# Patient Record
Sex: MALE | Race: OTHER | Employment: FULL TIME | URBAN - METROPOLITAN AREA
[De-identification: names, ages, dates, MRNs, and addresses within clinical notes are randomized per-mention and may not be internally consistent; named-entity substitution may affect disease eponyms.]

---

## 2018-01-20 ENCOUNTER — HOSPITAL ENCOUNTER (OUTPATIENT)
Dept: GENERAL RADIOLOGY | Age: 33
Discharge: HOME | End: 2018-01-20
Attending: FAMILY MEDICINE
Payer: COMMERCIAL

## 2018-01-20 ENCOUNTER — OFFICE VISIT (OUTPATIENT)
Dept: FAMILY MEDICINE CLINIC | Facility: CLINIC | Age: 33
End: 2018-01-20

## 2018-01-20 VITALS
HEART RATE: 47 BPM | WEIGHT: 152.38 LBS | SYSTOLIC BLOOD PRESSURE: 98 MMHG | DIASTOLIC BLOOD PRESSURE: 55 MMHG | HEIGHT: 67 IN | BODY MASS INDEX: 23.92 KG/M2

## 2018-01-20 DIAGNOSIS — M25.511 ACUTE PAIN OF RIGHT SHOULDER: ICD-10-CM

## 2018-01-20 DIAGNOSIS — M25.561 ACUTE PAIN OF RIGHT KNEE: ICD-10-CM

## 2018-01-20 DIAGNOSIS — M25.511 ACUTE PAIN OF RIGHT SHOULDER: Primary | ICD-10-CM

## 2018-01-20 PROCEDURE — 99212 OFFICE O/P EST SF 10 MIN: CPT | Performed by: FAMILY MEDICINE

## 2018-01-20 PROCEDURE — 73030 X-RAY EXAM OF SHOULDER: CPT | Performed by: FAMILY MEDICINE

## 2018-01-20 PROCEDURE — 99203 OFFICE O/P NEW LOW 30 MIN: CPT | Performed by: FAMILY MEDICINE

## 2018-01-20 NOTE — PROGRESS NOTES
Ernestina Garcia is a 33year old male. Patient presents with:  Shoulder Pain: right shoulder  Knee Pain: right knee     HPI:   2 months - Right shoulder pain - no injury. Bothers him but does not limit him for doing activities.  When lifts straight up, it cr shoulder  voltaren gel up to QID. Start nightly and icing. Referral for PT and x-ray    2. Acute pain of right knee  Nl x-ray 2015. Mild strain. PT      The patient indicates understanding of these issues and agrees to the plan.       Germaine Duff MD

## 2018-01-23 ENCOUNTER — TELEPHONE (OUTPATIENT)
Dept: OTHER | Age: 33
End: 2018-01-23

## 2018-01-24 NOTE — TELEPHONE ENCOUNTER
PA for Diclofenac Sodium 1% gel completed with [facility] via [facility]MM response time 3-5 business days [address]

## 2018-01-30 NOTE — TELEPHONE ENCOUNTER
Fax received from [facility] stating diclofenac was denied. pt must have tried a generic prescription strength oral NSAID in the past 90 days. Trial of an OTC NSAID will not meet criteria.

## 2018-02-03 NOTE — TELEPHONE ENCOUNTER
Spoke with patient (name and  verified), reviewed information, patient verbalized understanding and agrees with plan.   PATIENT PAID OUT OF POCKET FOR DICLOFENAC GEL, $50 WILL CALL BACK IF NEEDS A REFILL OR IF HE DOES NOT FIND IT EFFECTIVE

## 2018-02-08 ENCOUNTER — APPOINTMENT (OUTPATIENT)
Dept: PHYSICAL THERAPY | Facility: HOSPITAL | Age: 33
End: 2018-02-08
Attending: FAMILY MEDICINE
Payer: COMMERCIAL

## 2018-02-12 ENCOUNTER — APPOINTMENT (OUTPATIENT)
Dept: PHYSICAL THERAPY | Facility: HOSPITAL | Age: 33
End: 2018-02-12
Attending: FAMILY MEDICINE
Payer: COMMERCIAL

## 2018-02-14 ENCOUNTER — APPOINTMENT (OUTPATIENT)
Dept: PHYSICAL THERAPY | Facility: HOSPITAL | Age: 33
End: 2018-02-14
Attending: FAMILY MEDICINE
Payer: COMMERCIAL

## 2018-02-19 ENCOUNTER — APPOINTMENT (OUTPATIENT)
Dept: PHYSICAL THERAPY | Facility: HOSPITAL | Age: 33
End: 2018-02-19
Attending: FAMILY MEDICINE
Payer: COMMERCIAL

## 2018-02-20 ENCOUNTER — TELEPHONE (OUTPATIENT)
Dept: OTHER | Age: 33
End: 2018-02-20

## 2018-02-20 RX ORDER — MELOXICAM 7.5 MG/1
7.5 TABLET ORAL 2 TIMES DAILY
Qty: 40 TABLET | Refills: 0 | Status: SHIPPED | OUTPATIENT
Start: 2018-02-20

## 2018-02-20 NOTE — TELEPHONE ENCOUNTER
LMTCB for pt. May transfer to Triage. No HIPAA on file designating can discuss with spouse who called.

## 2018-02-20 NOTE — TELEPHONE ENCOUNTER
Dr Mario Torres: spouse called on behalf of patient who is at work;  States patient paid out of pocket for Diclofenac gel. He tried medication and doesn't help relieve his shoulder pain. Patient is scheduled for PT in April.   (conflict with work schedule and PT

## 2018-02-22 ENCOUNTER — APPOINTMENT (OUTPATIENT)
Dept: PHYSICAL THERAPY | Facility: HOSPITAL | Age: 33
End: 2018-02-22
Attending: FAMILY MEDICINE
Payer: COMMERCIAL

## 2018-02-23 NOTE — TELEPHONE ENCOUNTER
[facility] and was informed pt picked up the meloxicam yesterday (was covered by insurance). Closing encounter since pt is not returning out calls.

## 2018-02-26 ENCOUNTER — APPOINTMENT (OUTPATIENT)
Dept: PHYSICAL THERAPY | Facility: HOSPITAL | Age: 33
End: 2018-02-26
Attending: FAMILY MEDICINE
Payer: COMMERCIAL

## 2018-03-01 ENCOUNTER — APPOINTMENT (OUTPATIENT)
Dept: PHYSICAL THERAPY | Facility: HOSPITAL | Age: 33
End: 2018-03-01
Attending: FAMILY MEDICINE
Payer: COMMERCIAL

## 2018-03-08 ENCOUNTER — APPOINTMENT (OUTPATIENT)
Dept: PHYSICAL THERAPY | Facility: HOSPITAL | Age: 33
End: 2018-03-08
Attending: FAMILY MEDICINE
Payer: COMMERCIAL

## 2018-04-02 ENCOUNTER — TELEPHONE (OUTPATIENT)
Dept: FAMILY MEDICINE CLINIC | Facility: CLINIC | Age: 33
End: 2018-04-02

## 2018-04-02 ENCOUNTER — OFFICE VISIT (OUTPATIENT)
Dept: PHYSICAL THERAPY | Age: 33
End: 2018-04-02
Attending: FAMILY MEDICINE
Payer: COMMERCIAL

## 2018-04-02 DIAGNOSIS — M25.511 ACUTE PAIN OF RIGHT SHOULDER: ICD-10-CM

## 2018-04-02 DIAGNOSIS — M25.561 ACUTE PAIN OF RIGHT KNEE: Primary | ICD-10-CM

## 2018-04-02 DIAGNOSIS — M25.561 ACUTE PAIN OF RIGHT KNEE: ICD-10-CM

## 2018-04-02 PROCEDURE — 97530 THERAPEUTIC ACTIVITIES: CPT | Performed by: PHYSICAL THERAPIST

## 2018-04-02 PROCEDURE — 97161 PT EVAL LOW COMPLEX 20 MIN: CPT | Performed by: PHYSICAL THERAPIST

## 2018-04-02 NOTE — PROGRESS NOTES
P.T. EVALUATION:   Referring Physician: Dr. Justin Kuhn  Diagnosis: Acute pain of right knee (M25.561)  Acute pain of right shoulder (M25.511)    Date of Onset: 1/20/2018 Date of Service: 4/2/2018     PATIENT SUMMARY   Yumiko Coronado is a 33year old y/o ma patient. Charges: PT Eval Low Complexity, Thera Act1      Total Timed Treatment: 15 min     Total Treatment Time: 45 min         PLAN OF CARE:    Goals: to be met in 6 weeks  1.  Patient will be independent with HEP, its progression, and management of re

## 2018-04-02 NOTE — TELEPHONE ENCOUNTER
Lashonda De Jesus from Physical therapy is calling asking to extend his physical therapy orders, he has an appt at [address] [facility] today. Patient of Dr. Tacho James. Please advise. Physical therapy order pended for you.

## 2018-04-10 ENCOUNTER — OFFICE VISIT (OUTPATIENT)
Dept: PHYSICAL THERAPY | Age: 33
End: 2018-04-10
Attending: FAMILY MEDICINE
Payer: COMMERCIAL

## 2018-04-10 PROCEDURE — 97110 THERAPEUTIC EXERCISES: CPT

## 2018-04-10 NOTE — PROGRESS NOTES
Diagnosis: Diagnosis: Acute pain of right knee (M25.561)  Acute pain of right shoulder (M25.511)          Next MD visit: none scheduled  Fall Risk: standard         Precautions: n/a          Medication Changes since last visit?: No    Subjective: Patient r Treatment Time: 55 min

## 2018-04-12 ENCOUNTER — OFFICE VISIT (OUTPATIENT)
Dept: PHYSICAL THERAPY | Age: 33
End: 2018-04-12
Attending: FAMILY MEDICINE
Payer: COMMERCIAL

## 2018-04-12 PROCEDURE — 97110 THERAPEUTIC EXERCISES: CPT

## 2018-04-12 NOTE — PROGRESS NOTES
Diagnosis: Diagnosis: Acute pain of right knee (M25.561)  Acute pain of right shoulder (M25.511)          Next MD visit: none scheduled  Fall Risk: standard         Precautions: n/a          Medication Changes since last visit?: No    Subjective: Patient r Assessment: Initiated tricep strengthening and progressed rotator cuff and scapular stabilization exercises without increased pain noted to address deficits. Plan: Reassess patient symptoms. Attempt pec minor stretch.        Charges: EX 3

## 2018-04-17 ENCOUNTER — OFFICE VISIT (OUTPATIENT)
Dept: PHYSICAL THERAPY | Age: 33
End: 2018-04-17
Attending: FAMILY MEDICINE
Payer: COMMERCIAL

## 2018-04-17 PROCEDURE — 97110 THERAPEUTIC EXERCISES: CPT

## 2018-04-17 NOTE — PROGRESS NOTES
Diagnosis: Diagnosis: Acute pain of right knee (M25.561)  Acute pain of right shoulder (M25.511)          Next MD visit: none scheduled  Fall Risk: standard         Precautions: n/a          Medication Changes since last visit?: No    Subjective: Patient r under elbow  - standing lower trap facing wall lift offs 5\" holds 2 x 10  - wall push ups with SB 2 x 10    - standing B mid trap horizontal abduction with OH shoulder flexion 2 x 10 with RTB    Manual PT      Thera Activity      Modalities            Ass

## 2018-04-19 ENCOUNTER — OFFICE VISIT (OUTPATIENT)
Dept: PHYSICAL THERAPY | Age: 33
End: 2018-04-19
Attending: FAMILY MEDICINE
Payer: COMMERCIAL

## 2018-04-19 PROCEDURE — 97110 THERAPEUTIC EXERCISES: CPT

## 2018-04-19 NOTE — PROGRESS NOTES
Diagnosis: Diagnosis: Acute pain of right knee (M25.561)  Acute pain of right shoulder (M25.511)          Next MD visit: none scheduled  Fall Risk: standard         Precautions: n/a          Medication Changes since last visit?: No    Subjective: Patient r SB wall push ups 2 x 10   - OH SB toss at wall 2 x 1 minute    - doorway pec minor stretch 5\" holds x 10      Manual PT      Thera Activity      Modalities            Assessment: Advanced reps and exercises to address deficits without increased pain noted

## 2018-04-24 ENCOUNTER — OFFICE VISIT (OUTPATIENT)
Dept: PHYSICAL THERAPY | Age: 33
End: 2018-04-24
Attending: FAMILY MEDICINE
Payer: COMMERCIAL

## 2018-04-24 PROCEDURE — 97110 THERAPEUTIC EXERCISES: CPT

## 2018-04-24 NOTE — PROGRESS NOTES
Diagnosis: Diagnosis: Acute pain of right knee (M25.561)  Acute pain of right shoulder (M25.511)          Next MD visit: none scheduled  Fall Risk: standard         Precautions: n/a          Medication Changes since last visit?: No    Subjective: Patient r exercises w/o pain. Plan: Reassess patient symptoms.        Charges: EX 3       Total Timed Treatment: 50 min  Total Treatment Time: 50 min

## 2018-04-26 ENCOUNTER — OFFICE VISIT (OUTPATIENT)
Dept: PHYSICAL THERAPY | Age: 33
End: 2018-04-26
Attending: FAMILY MEDICINE
Payer: COMMERCIAL

## 2018-04-26 PROCEDURE — 97110 THERAPEUTIC EXERCISES: CPT

## 2018-04-26 NOTE — PROGRESS NOTES
Diagnosis: Diagnosis: Acute pain of right knee (M25.561)  Acute pain of right shoulder (M25.511)          Next MD visit: none scheduled  Fall Risk: standard         Precautions: n/a          Medication Changes since last visit?: No    Subjective: Patient r Activity      Modalities            Assessment: Increased focus on end range ER strengthening above 90 degrees abduction to address strength deficits. Issued yellow t-band for HEP. Collaborated with PT patient response to today's treatment.        Plan: Carito Motta

## 2018-04-30 ENCOUNTER — OFFICE VISIT (OUTPATIENT)
Dept: PHYSICAL THERAPY | Age: 33
End: 2018-04-30
Attending: FAMILY MEDICINE
Payer: COMMERCIAL

## 2018-04-30 PROCEDURE — 97530 THERAPEUTIC ACTIVITIES: CPT | Performed by: PHYSICAL THERAPIST

## 2018-04-30 PROCEDURE — 97110 THERAPEUTIC EXERCISES: CPT | Performed by: PHYSICAL THERAPIST

## 2018-04-30 NOTE — PROGRESS NOTES
Diagnosis: Diagnosis: Acute pain of right knee (M25.561)  Acute pain of right shoulder (M25.511)          Next MD visit: none scheduled  Fall Risk: standard         Precautions: n/a          Medication Changes since last visit?: No    Subjective: Patient s

## 2018-05-07 ENCOUNTER — TELEPHONE (OUTPATIENT)
Dept: PHYSICAL THERAPY | Age: 33
End: 2018-05-07

## 2018-05-07 ENCOUNTER — APPOINTMENT (OUTPATIENT)
Dept: PHYSICAL THERAPY | Age: 33
End: 2018-05-07
Attending: INTERNAL MEDICINE
Payer: COMMERCIAL

## 2019-08-16 ENCOUNTER — TELEPHONE (OUTPATIENT)
Dept: OPHTHALMOLOGY | Facility: CLINIC | Age: 34
End: 2019-08-16

## 2019-08-16 ENCOUNTER — NURSE TRIAGE (OUTPATIENT)
Dept: OTHER | Age: 34
End: 2019-08-16

## 2019-08-16 ENCOUNTER — HOSPITAL ENCOUNTER (OUTPATIENT)
Age: 34
Discharge: HOME | End: 2019-08-16
Attending: FAMILY MEDICINE
Payer: COMMERCIAL

## 2019-08-16 VITALS
RESPIRATION RATE: 18 BRPM | TEMPERATURE: 98 F | DIASTOLIC BLOOD PRESSURE: 74 MMHG | HEART RATE: 53 BPM | WEIGHT: 150 LBS | HEIGHT: 67 IN | BODY MASS INDEX: 23.54 KG/M2 | OXYGEN SATURATION: 97 % | SYSTOLIC BLOOD PRESSURE: 127 MMHG

## 2019-08-16 DIAGNOSIS — H01.024 SQUAMOUS BLEPHARITIS OF LEFT UPPER EYELID: Primary | ICD-10-CM

## 2019-08-16 PROCEDURE — 99203 OFFICE O/P NEW LOW 30 MIN: CPT

## 2019-08-16 PROCEDURE — 99213 OFFICE O/P EST LOW 20 MIN: CPT

## 2019-08-16 RX ORDER — ERYTHROMYCIN 5 MG/G
1 OINTMENT OPHTHALMIC 3 TIMES DAILY
Qty: 1 TUBE | Refills: 0 | Status: SHIPPED | OUTPATIENT
Start: 2019-08-16 | End: 2019-08-23

## 2019-08-16 NOTE — TELEPHONE ENCOUNTER
Please reply to pool: EM RN TRIAGE    Action Requested: Summary for Provider     []  Critical Lab, Recommendations Needed  [x] Need Additional Advice  []   FYI    []   Need Orders  [] Need Medications Sent to Pharmacy  []  Other     SUMMARY: spoke with [facility]

## 2019-08-16 NOTE — TELEPHONE ENCOUNTER
Spoke to patient after nurse triage sent call through. Pt complaining of swollen LEONEL with a small bump that started yesterday. Instructed pt  to use warm compresses for a few days. Told him to call back if symptoms do not improve or get worse.  Pt states he

## 2019-08-17 NOTE — ED NOTES
Wash hands before and after oint. warm cloth to eye 2 times daily and as needed.  Follow up with pp in office

## 2019-08-17 NOTE — ED PROVIDER NOTES
Patient Seen in: [facility] In [address] [place] [address]    History   Patient presents with:  Eyelid Swelling    Stated Complaint: Left Eye Problem    HPI    Left eyelid swelling and redness.    Since yesterday   No eye drainage   No vision changes has a normal mood and affect. His behavior is normal.   Nursing note and vitals reviewed.         ED Course     MDM     Disposition and Plan     Clinical Impression:  Squamous blepharitis of left upper eyelid  (primary encounter diagnosis)     Warm compress

## 2020-05-20 ENCOUNTER — TELEPHONE (OUTPATIENT)
Dept: FAMILY MEDICINE CLINIC | Facility: CLINIC | Age: 35
End: 2020-05-20

## 2020-05-20 DIAGNOSIS — U07.1 COVID-19: Primary | ICD-10-CM

## 2020-06-29 ENCOUNTER — NURSE TRIAGE (OUTPATIENT)
Dept: FAMILY MEDICINE CLINIC | Facility: CLINIC | Age: 35
End: 2020-06-29

## 2020-06-29 NOTE — TELEPHONE ENCOUNTER
Action Requested: Summary for Provider     []  Critical Lab, Recommendations Needed  [x] Need Additional Advice  []   FYI    []   Need Orders  [] Need Medications Sent to Pharmacy  []  Other     SUMMARY: wife calling to report an eye problem for her sangban

## 2020-06-30 NOTE — TELEPHONE ENCOUNTER
Patient's wife called, instructed of Dr. Aura Rivera recommendations. She verbalized understanding. Dr. Nadeem Andujar office phone number, [phone], and Dr. Ronit Sorensen office phone number, [phone], given to spouse.

## 2020-06-30 NOTE — TELEPHONE ENCOUNTER
I have not seen this patient in 2 years. Cannot give medications without an office visit but given length of issues with this I would recommend he see eye doctor. Can give number for Dr. Sarah Bradshaw and Dr. Mary Kate Tovar.

## 2020-07-01 ENCOUNTER — TELEPHONE (OUTPATIENT)
Dept: OPHTHALMOLOGY | Facility: CLINIC | Age: 35
End: 2020-07-01

## 2020-07-01 NOTE — TELEPHONE ENCOUNTER
Per pt's spouse, pt is being referred by PCP for a stye in upper lid of right eye. Pt's eye is getting swollen and red.  Please advise

## 2020-07-02 NOTE — TELEPHONE ENCOUNTER
Patient has a stye on his right upper lid x 6 days. Wife says that this has happened to him before and it happens about 2-3 times per year. Recommend aggressive warm compresses; told patient's wife he should use them 40-50  times per day. Told her that he should use warm compresses twice a day forever to prevent further chalazia. I offered him an appointment next week with [facility]IE, but the wife declined, starting that he will try the warm compresses and let us know if there is no improvement.

## 2022-02-18 ENCOUNTER — OFFICE VISIT (OUTPATIENT)
Dept: FAMILY MEDICINE CLINIC | Facility: CLINIC | Age: 37
End: 2022-02-18
Payer: COMMERCIAL

## 2022-02-18 VITALS
DIASTOLIC BLOOD PRESSURE: 62 MMHG | HEIGHT: 67 IN | SYSTOLIC BLOOD PRESSURE: 112 MMHG | BODY MASS INDEX: 26.84 KG/M2 | WEIGHT: 171 LBS | HEART RATE: 70 BPM

## 2022-02-18 DIAGNOSIS — Z00.00 PHYSICAL EXAM: Primary | ICD-10-CM

## 2022-02-18 PROCEDURE — 3078F DIAST BP <80 MM HG: CPT | Performed by: FAMILY MEDICINE

## 2022-02-18 PROCEDURE — 3074F SYST BP LT 130 MM HG: CPT | Performed by: FAMILY MEDICINE

## 2022-02-18 PROCEDURE — 90715 TDAP VACCINE 7 YRS/> IM: CPT | Performed by: FAMILY MEDICINE

## 2022-02-18 PROCEDURE — 90471 IMMUNIZATION ADMIN: CPT | Performed by: FAMILY MEDICINE

## 2022-02-18 PROCEDURE — 99385 PREV VISIT NEW AGE 18-39: CPT | Performed by: FAMILY MEDICINE

## 2022-02-18 PROCEDURE — 3008F BODY MASS INDEX DOCD: CPT | Performed by: FAMILY MEDICINE

## 2022-02-19 ENCOUNTER — LAB ENCOUNTER (OUTPATIENT)
Dept: LAB | Age: 37
End: 2022-02-19
Attending: FAMILY MEDICINE
Payer: COMMERCIAL

## 2022-02-19 DIAGNOSIS — Z00.00 PHYSICAL EXAM: ICD-10-CM

## 2022-02-19 LAB
ALBUMIN SERPL-MCNC: 4.1 G/DL (ref 3.4–5)
ALBUMIN/GLOB SERPL: 1.3 {RATIO} (ref 1–2)
ALP LIVER SERPL-CCNC: 67 U/L
ALT SERPL-CCNC: 26 U/L
ANION GAP SERPL CALC-SCNC: 5 MMOL/L (ref 0–18)
AST SERPL-CCNC: 23 U/L (ref 15–37)
BASOPHILS # BLD AUTO: 0.03 X10(3) UL (ref 0–0.2)
BASOPHILS NFR BLD AUTO: 0.5 %
BILIRUB SERPL-MCNC: 0.3 MG/DL (ref 0.1–2)
BILIRUB UR QL: NEGATIVE
BUN BLD-MCNC: 18 MG/DL (ref 7–18)
BUN/CREAT SERPL: 18.9 (ref 10–20)
CALCIUM BLD-MCNC: 9.5 MG/DL (ref 8.5–10.1)
CHLORIDE SERPL-SCNC: 108 MMOL/L (ref 98–112)
CHOLEST SERPL-MCNC: 174 MG/DL (ref ?–200)
CLARITY UR: CLEAR
CO2 SERPL-SCNC: 28 MMOL/L (ref 21–32)
COLOR UR: YELLOW
CREAT BLD-MCNC: 0.95 MG/DL
DEPRECATED RDW RBC AUTO: 42 FL (ref 35.1–46.3)
EOSINOPHIL # BLD AUTO: 0.12 X10(3) UL (ref 0–0.7)
EOSINOPHIL NFR BLD AUTO: 2 %
ERYTHROCYTE [DISTWIDTH] IN BLOOD BY AUTOMATED COUNT: 12.4 % (ref 11–15)
EST. AVERAGE GLUCOSE BLD GHB EST-MCNC: 123 MG/DL (ref 68–126)
FASTING PATIENT LIPID ANSWER: YES
FASTING STATUS PATIENT QL REPORTED: YES
GLOBULIN PLAS-MCNC: 3.1 G/DL (ref 2.8–4.4)
GLUCOSE BLD-MCNC: 105 MG/DL (ref 70–99)
GLUCOSE UR-MCNC: NEGATIVE MG/DL
HBA1C MFR BLD: 5.9 % (ref ?–5.7)
HCT VFR BLD AUTO: 42.8 %
HDLC SERPL-MCNC: 36 MG/DL (ref 40–59)
HGB BLD-MCNC: 14.1 G/DL
HGB UR QL STRIP.AUTO: NEGATIVE
IMM GRANULOCYTES # BLD AUTO: 0 X10(3) UL (ref 0–1)
IMM GRANULOCYTES NFR BLD: 0 %
KETONES UR-MCNC: NEGATIVE MG/DL
LDLC SERPL CALC-MCNC: 123 MG/DL (ref ?–100)
LEUKOCYTE ESTERASE UR QL STRIP.AUTO: NEGATIVE
LYMPHOCYTES # BLD AUTO: 2.11 X10(3) UL (ref 1–4)
LYMPHOCYTES NFR BLD AUTO: 35.6 %
MCH RBC QN AUTO: 30.3 PG (ref 26–34)
MCHC RBC AUTO-ENTMCNC: 32.9 G/DL (ref 31–37)
MCV RBC AUTO: 92 FL
MONOCYTES # BLD AUTO: 0.65 X10(3) UL (ref 0.1–1)
MONOCYTES NFR BLD AUTO: 11 %
NEUTROPHILS # BLD AUTO: 3.01 X10 (3) UL (ref 1.5–7.7)
NEUTROPHILS # BLD AUTO: 3.01 X10(3) UL (ref 1.5–7.7)
NEUTROPHILS NFR BLD AUTO: 50.9 %
NITRITE UR QL STRIP.AUTO: NEGATIVE
NONHDLC SERPL-MCNC: 138 MG/DL (ref ?–130)
OSMOLALITY SERPL CALC.SUM OF ELEC: 294 MOSM/KG (ref 275–295)
PH UR: 6 [PH] (ref 5–8)
PLATELET # BLD AUTO: 292 10(3)UL (ref 150–450)
POTASSIUM SERPL-SCNC: 4.5 MMOL/L (ref 3.5–5.1)
PROT SERPL-MCNC: 7.2 G/DL (ref 6.4–8.2)
PROT UR-MCNC: 30 MG/DL
RBC # BLD AUTO: 4.65 X10(6)UL
SODIUM SERPL-SCNC: 141 MMOL/L (ref 136–145)
SP GR UR STRIP: 1.03 (ref 1–1.03)
T4 FREE SERPL-MCNC: 1 NG/DL (ref 0.8–1.7)
TRIGL SERPL-MCNC: 81 MG/DL (ref 30–149)
TSI SER-ACNC: 5.18 MIU/ML (ref 0.36–3.74)
UROBILINOGEN UR STRIP-ACNC: <2
VIT D+METAB SERPL-MCNC: 23 NG/ML (ref 30–100)
VLDLC SERPL CALC-MCNC: 14 MG/DL (ref 0–30)
WBC # BLD AUTO: 5.9 X10(3) UL (ref 4–11)

## 2022-02-19 PROCEDURE — 93010 ELECTROCARDIOGRAM REPORT: CPT | Performed by: FAMILY MEDICINE

## 2022-02-19 PROCEDURE — 93005 ELECTROCARDIOGRAM TRACING: CPT

## 2022-02-19 PROCEDURE — 80061 LIPID PANEL: CPT

## 2022-02-19 PROCEDURE — 85025 COMPLETE CBC W/AUTO DIFF WBC: CPT

## 2022-02-19 PROCEDURE — 80053 COMPREHEN METABOLIC PANEL: CPT

## 2022-02-19 PROCEDURE — 84443 ASSAY THYROID STIM HORMONE: CPT

## 2022-02-19 PROCEDURE — 83036 HEMOGLOBIN GLYCOSYLATED A1C: CPT

## 2022-02-19 PROCEDURE — 81001 URINALYSIS AUTO W/SCOPE: CPT

## 2022-02-19 PROCEDURE — 36415 COLL VENOUS BLD VENIPUNCTURE: CPT

## 2022-02-19 PROCEDURE — 82306 VITAMIN D 25 HYDROXY: CPT

## 2022-02-19 PROCEDURE — 84439 ASSAY OF FREE THYROXINE: CPT

## 2022-02-21 RX ORDER — ERGOCALCIFEROL 1.25 MG/1
50000 CAPSULE ORAL WEEKLY
Qty: 12 CAPSULE | Refills: 4 | Status: SHIPPED | OUTPATIENT
Start: 2022-02-21 | End: 2022-03-23

## 2023-03-25 ENCOUNTER — OFFICE VISIT (OUTPATIENT)
Dept: FAMILY MEDICINE CLINIC | Facility: CLINIC | Age: 38
End: 2023-03-25

## 2023-03-25 VITALS
WEIGHT: 168 LBS | HEIGHT: 67 IN | DIASTOLIC BLOOD PRESSURE: 67 MMHG | HEART RATE: 54 BPM | BODY MASS INDEX: 26.37 KG/M2 | SYSTOLIC BLOOD PRESSURE: 129 MMHG

## 2023-03-25 DIAGNOSIS — R10.32 LEFT LOWER QUADRANT ABDOMINAL PAIN: Primary | ICD-10-CM

## 2023-03-25 PROCEDURE — 3008F BODY MASS INDEX DOCD: CPT | Performed by: FAMILY MEDICINE

## 2023-03-25 PROCEDURE — 99213 OFFICE O/P EST LOW 20 MIN: CPT | Performed by: FAMILY MEDICINE

## 2023-03-25 PROCEDURE — 3078F DIAST BP <80 MM HG: CPT | Performed by: FAMILY MEDICINE

## 2023-03-25 PROCEDURE — 3074F SYST BP LT 130 MM HG: CPT | Performed by: FAMILY MEDICINE

## 2023-04-22 ENCOUNTER — OFFICE VISIT (OUTPATIENT)
Dept: FAMILY MEDICINE CLINIC | Facility: CLINIC | Age: 38
End: 2023-04-22

## 2023-04-22 ENCOUNTER — LAB ENCOUNTER (OUTPATIENT)
Dept: LAB | Age: 38
End: 2023-04-22
Attending: FAMILY MEDICINE
Payer: COMMERCIAL

## 2023-04-22 ENCOUNTER — EKG ENCOUNTER (OUTPATIENT)
Dept: LAB | Age: 38
End: 2023-04-22
Attending: FAMILY MEDICINE
Payer: COMMERCIAL

## 2023-04-22 VITALS
HEART RATE: 47 BPM | DIASTOLIC BLOOD PRESSURE: 68 MMHG | HEIGHT: 67 IN | WEIGHT: 164.81 LBS | BODY MASS INDEX: 25.87 KG/M2 | SYSTOLIC BLOOD PRESSURE: 124 MMHG

## 2023-04-22 DIAGNOSIS — R07.9 CHEST PAIN, UNSPECIFIED TYPE: ICD-10-CM

## 2023-04-22 DIAGNOSIS — Z00.00 PHYSICAL EXAM: ICD-10-CM

## 2023-04-22 DIAGNOSIS — R10.32 LEFT INGUINAL PAIN: ICD-10-CM

## 2023-04-22 DIAGNOSIS — Z00.00 PHYSICAL EXAM: Primary | ICD-10-CM

## 2023-04-22 LAB
ALBUMIN SERPL-MCNC: 4 G/DL (ref 3.4–5)
ALBUMIN/GLOB SERPL: 1.1 {RATIO} (ref 1–2)
ALP LIVER SERPL-CCNC: 60 U/L
ALT SERPL-CCNC: 30 U/L
ANION GAP SERPL CALC-SCNC: 6 MMOL/L (ref 0–18)
AST SERPL-CCNC: 31 U/L (ref 15–37)
BASOPHILS # BLD AUTO: 0.03 X10(3) UL (ref 0–0.2)
BASOPHILS NFR BLD AUTO: 0.7 %
BILIRUB SERPL-MCNC: 0.6 MG/DL (ref 0.1–2)
BILIRUB UR QL: NEGATIVE
BUN BLD-MCNC: 19 MG/DL (ref 7–18)
BUN/CREAT SERPL: 18.6 (ref 10–20)
CALCIUM BLD-MCNC: 9.3 MG/DL (ref 8.5–10.1)
CHLORIDE SERPL-SCNC: 108 MMOL/L (ref 98–112)
CHOLEST SERPL-MCNC: 162 MG/DL (ref ?–200)
CLARITY UR: CLEAR
CO2 SERPL-SCNC: 28 MMOL/L (ref 21–32)
CREAT BLD-MCNC: 1.02 MG/DL
DEPRECATED RDW RBC AUTO: 42.5 FL (ref 35.1–46.3)
EOSINOPHIL # BLD AUTO: 0.12 X10(3) UL (ref 0–0.7)
EOSINOPHIL NFR BLD AUTO: 2.7 %
ERYTHROCYTE [DISTWIDTH] IN BLOOD BY AUTOMATED COUNT: 12.9 % (ref 11–15)
EST. AVERAGE GLUCOSE BLD GHB EST-MCNC: 120 MG/DL (ref 68–126)
FASTING PATIENT LIPID ANSWER: YES
FASTING STATUS PATIENT QL REPORTED: YES
GFR SERPLBLD BASED ON 1.73 SQ M-ARVRAT: 98 ML/MIN/1.73M2 (ref 60–?)
GLOBULIN PLAS-MCNC: 3.8 G/DL (ref 2.8–4.4)
GLUCOSE BLD-MCNC: 95 MG/DL (ref 70–99)
GLUCOSE UR-MCNC: NORMAL MG/DL
HBA1C MFR BLD: 5.8 % (ref ?–5.7)
HCT VFR BLD AUTO: 40.8 %
HDLC SERPL-MCNC: 43 MG/DL (ref 40–59)
HGB BLD-MCNC: 13.4 G/DL
HGB UR QL STRIP.AUTO: NEGATIVE
IMM GRANULOCYTES # BLD AUTO: 0.01 X10(3) UL (ref 0–1)
IMM GRANULOCYTES NFR BLD: 0.2 %
KETONES UR-MCNC: NEGATIVE MG/DL
LDLC SERPL CALC-MCNC: 110 MG/DL (ref ?–100)
LEUKOCYTE ESTERASE UR QL STRIP.AUTO: NEGATIVE
LYMPHOCYTES # BLD AUTO: 1.83 X10(3) UL (ref 1–4)
LYMPHOCYTES NFR BLD AUTO: 40.7 %
MCH RBC QN AUTO: 29.7 PG (ref 26–34)
MCHC RBC AUTO-ENTMCNC: 32.8 G/DL (ref 31–37)
MCV RBC AUTO: 90.5 FL
MONOCYTES # BLD AUTO: 0.45 X10(3) UL (ref 0.1–1)
MONOCYTES NFR BLD AUTO: 10 %
NEUTROPHILS # BLD AUTO: 2.06 X10 (3) UL (ref 1.5–7.7)
NEUTROPHILS # BLD AUTO: 2.06 X10(3) UL (ref 1.5–7.7)
NEUTROPHILS NFR BLD AUTO: 45.7 %
NITRITE UR QL STRIP.AUTO: NEGATIVE
NONHDLC SERPL-MCNC: 119 MG/DL (ref ?–130)
OSMOLALITY SERPL CALC.SUM OF ELEC: 296 MOSM/KG (ref 275–295)
PH UR: 7 [PH] (ref 5–8)
PLATELET # BLD AUTO: 323 10(3)UL (ref 150–450)
POTASSIUM SERPL-SCNC: 4.6 MMOL/L (ref 3.5–5.1)
PROT SERPL-MCNC: 7.8 G/DL (ref 6.4–8.2)
RBC # BLD AUTO: 4.51 X10(6)UL
SODIUM SERPL-SCNC: 142 MMOL/L (ref 136–145)
SP GR UR STRIP: 1.02 (ref 1–1.03)
T4 FREE SERPL-MCNC: 0.9 NG/DL (ref 0.8–1.7)
TRIGL SERPL-MCNC: 44 MG/DL (ref 30–149)
TSI SER-ACNC: 4.33 MIU/ML (ref 0.36–3.74)
UROBILINOGEN UR STRIP-ACNC: NORMAL
VIT D+METAB SERPL-MCNC: 32.8 NG/ML (ref 30–100)
VLDLC SERPL CALC-MCNC: 7 MG/DL (ref 0–30)
WBC # BLD AUTO: 4.5 X10(3) UL (ref 4–11)

## 2023-04-22 PROCEDURE — 81001 URINALYSIS AUTO W/SCOPE: CPT

## 2023-04-22 PROCEDURE — 36415 COLL VENOUS BLD VENIPUNCTURE: CPT

## 2023-04-22 PROCEDURE — 80061 LIPID PANEL: CPT

## 2023-04-22 PROCEDURE — 85025 COMPLETE CBC W/AUTO DIFF WBC: CPT

## 2023-04-22 PROCEDURE — 84443 ASSAY THYROID STIM HORMONE: CPT

## 2023-04-22 PROCEDURE — 80053 COMPREHEN METABOLIC PANEL: CPT

## 2023-04-22 PROCEDURE — 83036 HEMOGLOBIN GLYCOSYLATED A1C: CPT

## 2023-04-22 PROCEDURE — 93005 ELECTROCARDIOGRAM TRACING: CPT

## 2023-04-22 PROCEDURE — 84439 ASSAY OF FREE THYROXINE: CPT

## 2023-04-22 PROCEDURE — 93010 ELECTROCARDIOGRAM REPORT: CPT | Performed by: INTERNAL MEDICINE

## 2023-04-22 PROCEDURE — 82306 VITAMIN D 25 HYDROXY: CPT

## 2023-04-22 RX ORDER — NAPROXEN 250 MG/1
250 TABLET ORAL 2 TIMES DAILY WITH MEALS
Qty: 60 TABLET | Refills: 0 | Status: SHIPPED | OUTPATIENT
Start: 2023-04-22

## 2023-04-24 LAB
ATRIAL RATE: 41 BPM
P AXIS: 56 DEGREES
P-R INTERVAL: 166 MS
Q-T INTERVAL: 464 MS
QRS DURATION: 86 MS
QTC CALCULATION (BEZET): 382 MS
R AXIS: 26 DEGREES
T AXIS: 21 DEGREES
VENTRICULAR RATE: 41 BPM

## 2024-05-04 ENCOUNTER — LAB ENCOUNTER (OUTPATIENT)
Dept: LAB | Age: 39
End: 2024-05-04
Attending: FAMILY MEDICINE
Payer: COMMERCIAL

## 2024-05-04 ENCOUNTER — OFFICE VISIT (OUTPATIENT)
Dept: FAMILY MEDICINE CLINIC | Facility: CLINIC | Age: 39
End: 2024-05-04
Payer: COMMERCIAL

## 2024-05-04 VITALS
WEIGHT: 169.38 LBS | HEART RATE: 51 BPM | HEIGHT: 67 IN | BODY MASS INDEX: 26.58 KG/M2 | SYSTOLIC BLOOD PRESSURE: 118 MMHG | DIASTOLIC BLOOD PRESSURE: 74 MMHG

## 2024-05-04 DIAGNOSIS — Z00.00 PHYSICAL EXAM: Primary | ICD-10-CM

## 2024-05-04 DIAGNOSIS — Z00.00 PHYSICAL EXAM: ICD-10-CM

## 2024-05-04 LAB
ALBUMIN SERPL-MCNC: 4.4 G/DL (ref 3.2–4.8)
ALBUMIN/GLOB SERPL: 1.5 {RATIO} (ref 1–2)
ALP LIVER SERPL-CCNC: 59 U/L
ALT SERPL-CCNC: 25 U/L
ANION GAP SERPL CALC-SCNC: 5 MMOL/L (ref 0–18)
AST SERPL-CCNC: 37 U/L (ref ?–34)
BASOPHILS # BLD AUTO: 0.04 X10(3) UL (ref 0–0.2)
BASOPHILS NFR BLD AUTO: 0.7 %
BILIRUB SERPL-MCNC: 0.5 MG/DL (ref 0.3–1.2)
BILIRUB UR QL: NEGATIVE
BUN BLD-MCNC: 17 MG/DL (ref 9–23)
BUN/CREAT SERPL: 16.3 (ref 10–20)
CALCIUM BLD-MCNC: 9.2 MG/DL (ref 8.7–10.4)
CHLORIDE SERPL-SCNC: 109 MMOL/L (ref 98–112)
CHOLEST SERPL-MCNC: 179 MG/DL (ref ?–200)
CLARITY UR: CLEAR
CO2 SERPL-SCNC: 28 MMOL/L (ref 21–32)
CREAT BLD-MCNC: 1.04 MG/DL
DEPRECATED RDW RBC AUTO: 40.6 FL (ref 35.1–46.3)
EGFRCR SERPLBLD CKD-EPI 2021: 95 ML/MIN/1.73M2 (ref 60–?)
EOSINOPHIL # BLD AUTO: 0.16 X10(3) UL (ref 0–0.7)
EOSINOPHIL NFR BLD AUTO: 2.9 %
ERYTHROCYTE [DISTWIDTH] IN BLOOD BY AUTOMATED COUNT: 12.2 % (ref 11–15)
EST. AVERAGE GLUCOSE BLD GHB EST-MCNC: 126 MG/DL (ref 68–126)
FASTING PATIENT LIPID ANSWER: YES
FASTING STATUS PATIENT QL REPORTED: YES
GLOBULIN PLAS-MCNC: 3 G/DL (ref 2–3.5)
GLUCOSE BLD-MCNC: 98 MG/DL (ref 70–99)
GLUCOSE UR-MCNC: NORMAL MG/DL
HBA1C MFR BLD: 6 % (ref ?–5.7)
HCT VFR BLD AUTO: 39.8 %
HDLC SERPL-MCNC: 36 MG/DL (ref 40–59)
HGB BLD-MCNC: 13.7 G/DL
HGB UR QL STRIP.AUTO: NEGATIVE
IMM GRANULOCYTES # BLD AUTO: 0.01 X10(3) UL (ref 0–1)
IMM GRANULOCYTES NFR BLD: 0.2 %
KETONES UR-MCNC: NEGATIVE MG/DL
LDLC SERPL CALC-MCNC: 129 MG/DL (ref ?–100)
LEUKOCYTE ESTERASE UR QL STRIP.AUTO: NEGATIVE
LYMPHOCYTES # BLD AUTO: 2.35 X10(3) UL (ref 1–4)
LYMPHOCYTES NFR BLD AUTO: 42 %
MCH RBC QN AUTO: 31.1 PG (ref 26–34)
MCHC RBC AUTO-ENTMCNC: 34.4 G/DL (ref 31–37)
MCV RBC AUTO: 90.2 FL
MONOCYTES # BLD AUTO: 0.5 X10(3) UL (ref 0.1–1)
MONOCYTES NFR BLD AUTO: 8.9 %
NEUTROPHILS # BLD AUTO: 2.54 X10 (3) UL (ref 1.5–7.7)
NEUTROPHILS # BLD AUTO: 2.54 X10(3) UL (ref 1.5–7.7)
NEUTROPHILS NFR BLD AUTO: 45.3 %
NITRITE UR QL STRIP.AUTO: NEGATIVE
NONHDLC SERPL-MCNC: 143 MG/DL (ref ?–130)
OSMOLALITY SERPL CALC.SUM OF ELEC: 296 MOSM/KG (ref 275–295)
PH UR: 6.5 [PH] (ref 5–8)
PLATELET # BLD AUTO: 310 10(3)UL (ref 150–450)
POTASSIUM SERPL-SCNC: 4.5 MMOL/L (ref 3.5–5.1)
PROT SERPL-MCNC: 7.4 G/DL (ref 5.7–8.2)
PROT UR-MCNC: NEGATIVE MG/DL
RBC # BLD AUTO: 4.41 X10(6)UL
SODIUM SERPL-SCNC: 142 MMOL/L (ref 136–145)
SP GR UR STRIP: 1.03 (ref 1–1.03)
TRIGL SERPL-MCNC: 75 MG/DL (ref 30–149)
TSI SER-ACNC: 4.15 MIU/ML (ref 0.55–4.78)
UROBILINOGEN UR STRIP-ACNC: NORMAL
VIT D+METAB SERPL-MCNC: 27.7 NG/ML (ref 30–100)
VLDLC SERPL CALC-MCNC: 13 MG/DL (ref 0–30)
WBC # BLD AUTO: 5.6 X10(3) UL (ref 4–11)

## 2024-05-04 PROCEDURE — 80061 LIPID PANEL: CPT

## 2024-05-04 PROCEDURE — 3074F SYST BP LT 130 MM HG: CPT | Performed by: FAMILY MEDICINE

## 2024-05-04 PROCEDURE — 82306 VITAMIN D 25 HYDROXY: CPT

## 2024-05-04 PROCEDURE — 99395 PREV VISIT EST AGE 18-39: CPT | Performed by: FAMILY MEDICINE

## 2024-05-04 PROCEDURE — 85025 COMPLETE CBC W/AUTO DIFF WBC: CPT

## 2024-05-04 PROCEDURE — 84443 ASSAY THYROID STIM HORMONE: CPT

## 2024-05-04 PROCEDURE — 3078F DIAST BP <80 MM HG: CPT | Performed by: FAMILY MEDICINE

## 2024-05-04 PROCEDURE — 3008F BODY MASS INDEX DOCD: CPT | Performed by: FAMILY MEDICINE

## 2024-05-04 PROCEDURE — 80053 COMPREHEN METABOLIC PANEL: CPT

## 2024-05-04 PROCEDURE — 81003 URINALYSIS AUTO W/O SCOPE: CPT

## 2024-05-04 PROCEDURE — 83036 HEMOGLOBIN GLYCOSYLATED A1C: CPT

## 2024-05-04 PROCEDURE — 36415 COLL VENOUS BLD VENIPUNCTURE: CPT

## 2024-05-04 NOTE — PROGRESS NOTES
5/4/2024  8:49 AM    Casey Montes is a 38 year old male.    Chief complaint(s):   Chief Complaint   Patient presents with    Routine Physical     HPI:     Casey Montes primary complaint is regarding CPE.       Casey Montes is a 38 year old male is here for routine periodic health screening and examination.  His last physical exam was 1 years ago.  His last ECG was 1 year ago . His last diabetes screening test was 1 yr ago, normal .   His last cholesterol test was 1 year ago and was normal.  Last dentist visit was 5months ago.  He is current with his Td immunization, 2022.  He is not current with his Flu vaccination.  Patient last colonoscopy was none. He is not a smoker.       HISTORY:  No past medical history on file.   No past surgical history on file.   Family History   Problem Relation Age of Onset    Hypertension Paternal Grandmother     Cancer Paternal Grandmother         colon cancer    Diabetes Paternal Grandfather     Hypertension Paternal Grandfather     Lipids Father     Hypertension Father     Stroke Father       Social History:   Social History     Socioeconomic History    Marital status:    Tobacco Use    Smoking status: Former     Current packs/day: 0.00     Average packs/day: 0.2 packs/day for 5.0 years (1.0 ttl pk-yrs)     Types: Cigarettes     Start date: 11/1/2008     Quit date: 11/1/2013     Years since quitting: 10.5    Smokeless tobacco: Former   Substance and Sexual Activity    Alcohol use: Yes     Comment: socially     Drug use: No        Immunizations:   Immunization History   Administered Date(s) Administered    DT 01/01/2013    TDAP 02/18/2022       Medications (Active prior to today's visit):  Current Outpatient Medications   Medication Sig Dispense Refill    naproxen 250 MG Oral Tab Take 1 tablet (250 mg total) by mouth 2 (two) times daily with meals. (Patient not taking: Reported on 5/4/2024) 60 tablet 0       Allergies:  No Known Allergies      ROS:   Review  of Systems   Constitutional:  Negative for appetite change, fatigue and fever.   HENT:  Negative for hearing loss and nosebleeds.    Eyes:  Negative for pain and visual disturbance.   Respiratory:  Negative for apnea and shortness of breath.    Cardiovascular:  Negative for chest pain, palpitations and leg swelling.   Gastrointestinal:  Negative for abdominal pain, blood in stool, constipation, diarrhea, nausea and vomiting.   Endocrine: Negative for polydipsia and polyuria.   Genitourinary:  Negative for decreased urine volume, frequency and hematuria.        No nocturia   Musculoskeletal:  Negative for arthralgias.   Skin:  Negative for rash.   Neurological:  Negative for dizziness, syncope and headaches.   Psychiatric/Behavioral:  Negative for dysphoric mood and sleep disturbance.        PHYSICAL EXAM:   VS: /74   Pulse 51   Ht 5' 7\" (1.702 m)   Wt 169 lb 6.4 oz (76.8 kg)   BMI 26.53 kg/m²     Physical Exam  Vitals reviewed.   Constitutional:       Appearance: Normal appearance.      Comments:      HENT:      Head: Normocephalic.      Right Ear: Hearing, tympanic membrane and ear canal normal.      Left Ear: Hearing, tympanic membrane and ear canal normal.      Nose: Nose normal. No rhinorrhea.      Mouth/Throat:      Mouth: Mucous membranes are moist.      Pharynx: Oropharynx is clear.   Eyes:      Extraocular Movements: Extraocular movements intact.      Conjunctiva/sclera: Conjunctivae normal.      Pupils: Pupils are equal, round, and reactive to light.   Neck:      Thyroid: No thyroid mass.      Vascular: No carotid bruit.   Cardiovascular:      Rate and Rhythm: Normal rate and regular rhythm.      Heart sounds: Normal heart sounds, S1 normal and S2 normal. No murmur heard.  Pulmonary:      Effort: Pulmonary effort is normal.      Breath sounds: Normal breath sounds.   Abdominal:      General: Abdomen is flat. Bowel sounds are normal.      Palpations: Abdomen is soft. There is no hepatomegaly,  splenomegaly or mass.      Tenderness: There is no abdominal tenderness.      Hernia: No hernia is present.   Musculoskeletal:      Cervical back: Neck supple.      Comments: Spinal exam without scoliosis.      Lymphadenopathy:      Cervical: No cervical adenopathy.   Skin:     General: Skin is warm.      Findings: No rash.   Neurological:      General: No focal deficit present.      Mental Status: He is alert.      Deep Tendon Reflexes:      Reflex Scores:       Patellar reflexes are 2+ on the right side and 2+ on the left side.  Psychiatric:         Attention and Perception: Attention normal.         Mood and Affect: Mood and affect normal.         LABORATORY RESULTS:     EKG / Spirometry : -     Radiology: No results found.     ASSESSMENT/PLAN:   Assessment   No diagnosis found.      CPE PLAN:    LABS / TEST & ORDERS for today's visit :Blood test(s) ordered today for send out to [facility] lab:  Orders Placed This Encounter   Procedures    CBC With Differential With Platelet    Comp Metabolic Panel (14)    Hemoglobin A1C    Lipid Panel    TSH W Reflex To Free T4    Vitamin D    Urinalysis with Culture Reflex    Referrals: None  In-House; Urine dip.  Test/Procedures done today include: EKG.    IMMUNIZATIONS: none given today.    RECOMMENDATIONS given include: ANTICIPATORY GUIDANCE  topics covered today include: safety (i.e. seat belts, helmets, sunscreen, protective sports gear ), nutrition (i.e. healthy meals and snacks (i.e. avoid junk food and high-carbohydrate foods); athletic conditioning, fluids; low fat milk, limit to less than 20 oz. a day; dental care ), and Healthy habits& Social competence & Responsibilities: Recommendations on physical activity; exercise daily or at least 3 times a week for 30-60 minutes doing cardiovascular exercise. Encourage to maintain the best physical and dental hygiene possible.  Consider a  if overweight and/or having difficult in staying active; attempt  to keep a schedule that includes an adequate sleep and physical exercise / activities Patient educated on doing regular self testicular exam. Patient was educated on sexual transmitted disease. Best to abstain from sexual intercourse until he is ready to form a family. Use of condoms may prevent transmission of infections as well as pregnancy.    FOLLOW-UP: Schedule a follow-up visit in 12 months.        Orders This Visit:  No orders of the defined types were placed in this encounter.      Meds This Visit:  Requested Prescriptions      No prescriptions requested or ordered in this encounter       Imaging & Referrals:  None         CHUCK MARTIN MD

## 2024-05-05 RX ORDER — ERGOCALCIFEROL 1.25 MG/1
50000 CAPSULE ORAL WEEKLY
Qty: 12 CAPSULE | Refills: 3 | Status: SHIPPED | OUTPATIENT
Start: 2024-05-05 | End: 2025-05-05